# Patient Record
Sex: FEMALE | Race: WHITE | NOT HISPANIC OR LATINO | Employment: UNEMPLOYED | URBAN - NONMETROPOLITAN AREA
[De-identification: names, ages, dates, MRNs, and addresses within clinical notes are randomized per-mention and may not be internally consistent; named-entity substitution may affect disease eponyms.]

---

## 2017-08-24 ENCOUNTER — HOSPITAL ENCOUNTER (EMERGENCY)
Facility: HOSPITAL | Age: 27
Discharge: HOME | End: 2017-08-25
Attending: EMERGENCY MEDICINE | Admitting: EMERGENCY MEDICINE

## 2017-08-24 DIAGNOSIS — IMO0002 LACERATION: Primary | ICD-10-CM

## 2017-08-24 DIAGNOSIS — IMO0002 SELF-INFLICTED INJURY: ICD-10-CM

## 2017-08-24 DIAGNOSIS — R74.8 ELEVATED LIVER ENZYMES: ICD-10-CM

## 2017-08-24 DIAGNOSIS — F32.A DEPRESSION, UNSPECIFIED DEPRESSION TYPE: ICD-10-CM

## 2017-08-24 PROCEDURE — 80307 DRUG TEST PRSMV CHEM ANLYZR: CPT | Performed by: EMERGENCY MEDICINE

## 2017-08-24 PROCEDURE — 80074 ACUTE HEPATITIS PANEL: CPT | Performed by: EMERGENCY MEDICINE

## 2017-08-24 PROCEDURE — 85025 COMPLETE CBC W/AUTO DIFF WBC: CPT | Performed by: EMERGENCY MEDICINE

## 2017-08-24 PROCEDURE — 99284 EMERGENCY DEPT VISIT MOD MDM: CPT

## 2017-08-24 PROCEDURE — 80053 COMPREHEN METABOLIC PANEL: CPT | Performed by: EMERGENCY MEDICINE

## 2017-08-24 RX ORDER — SODIUM CHLORIDE 0.9 % (FLUSH) 0.9 %
10 SYRINGE (ML) INJECTION AS NEEDED
Status: DISCONTINUED | OUTPATIENT
Start: 2017-08-24 | End: 2017-08-25

## 2017-08-25 VITALS
OXYGEN SATURATION: 99 % | SYSTOLIC BLOOD PRESSURE: 104 MMHG | TEMPERATURE: 98.3 F | WEIGHT: 203 LBS | DIASTOLIC BLOOD PRESSURE: 67 MMHG | HEIGHT: 66 IN | HEART RATE: 94 BPM | RESPIRATION RATE: 18 BRPM | BODY MASS INDEX: 32.62 KG/M2

## 2017-08-25 LAB
ALBUMIN SERPL-MCNC: 4.4 G/DL (ref 3.5–5)
ALBUMIN/GLOB SERPL: 1.5 G/DL (ref 1–2)
ALP SERPL-CCNC: 73 U/L (ref 38–126)
ALT SERPL W P-5'-P-CCNC: 392 U/L (ref 13–69)
AMPHET+METHAMPHET UR QL: NEGATIVE
AMPHETAMINES UR QL: NEGATIVE
ANION GAP SERPL CALCULATED.3IONS-SCNC: 18.4 MMOL/L
APAP SERPL-MCNC: <10 MCG/ML
AST SERPL-CCNC: 339 U/L (ref 15–46)
B-HCG UR QL: NEGATIVE
BACTERIA UR QL AUTO: ABNORMAL /HPF
BARBITURATES UR QL SCN: NEGATIVE
BASOPHILS # BLD AUTO: 0.06 10*3/MM3 (ref 0–0.2)
BASOPHILS NFR BLD AUTO: 0.8 % (ref 0–2.5)
BENZODIAZ UR QL SCN: NEGATIVE
BILIRUB SERPL-MCNC: 0.2 MG/DL (ref 0.2–1.3)
BILIRUB UR QL STRIP: NEGATIVE
BUN BLD-MCNC: 10 MG/DL (ref 7–20)
BUN/CREAT SERPL: 14.3 (ref 7.1–23.5)
BUPRENORPHINE SERPL-MCNC: NEGATIVE NG/ML
CALCIUM SPEC-SCNC: 8.9 MG/DL (ref 8.4–10.2)
CANNABINOIDS SERPL QL: NEGATIVE
CHLORIDE SERPL-SCNC: 109 MMOL/L (ref 98–107)
CLARITY UR: CLEAR
CO2 SERPL-SCNC: 22 MMOL/L (ref 26–30)
COCAINE UR QL: NEGATIVE
COLOR UR: YELLOW
CREAT BLD-MCNC: 0.7 MG/DL (ref 0.6–1.3)
DEPRECATED RDW RBC AUTO: 39.8 FL (ref 37–54)
EOSINOPHIL # BLD AUTO: 0.15 10*3/MM3 (ref 0–0.7)
EOSINOPHIL NFR BLD AUTO: 1.9 % (ref 0–7)
ERYTHROCYTE [DISTWIDTH] IN BLOOD BY AUTOMATED COUNT: 12.2 % (ref 11.5–14.5)
ETHANOL BLD-MCNC: 175 MG/DL
ETHANOL UR QL: 0.17 %
GFR SERPL CREATININE-BSD FRML MDRD: 101 ML/MIN/1.73
GLOBULIN UR ELPH-MCNC: 2.9 GM/DL
GLUCOSE BLD-MCNC: 101 MG/DL (ref 74–98)
GLUCOSE UR STRIP-MCNC: NEGATIVE MG/DL
HCT VFR BLD AUTO: 40.9 % (ref 37–47)
HGB BLD-MCNC: 13.3 G/DL (ref 12–16)
HGB UR QL STRIP.AUTO: ABNORMAL
HOLD SPECIMEN: NORMAL
HOLD SPECIMEN: NORMAL
HYALINE CASTS UR QL AUTO: ABNORMAL /LPF
IMM GRANULOCYTES # BLD: 0.02 10*3/MM3 (ref 0–0.06)
IMM GRANULOCYTES NFR BLD: 0.3 % (ref 0–0.6)
KETONES UR QL STRIP: NEGATIVE
LEUKOCYTE ESTERASE UR QL STRIP.AUTO: NEGATIVE
LYMPHOCYTES # BLD AUTO: 2.78 10*3/MM3 (ref 0.6–3.4)
LYMPHOCYTES NFR BLD AUTO: 35 % (ref 10–50)
MCH RBC QN AUTO: 29.2 PG (ref 27–31)
MCHC RBC AUTO-ENTMCNC: 32.5 G/DL (ref 30–37)
MCV RBC AUTO: 89.7 FL (ref 81–99)
METHADONE UR QL SCN: NEGATIVE
MONOCYTES # BLD AUTO: 0.66 10*3/MM3 (ref 0–0.9)
MONOCYTES NFR BLD AUTO: 8.3 % (ref 0–12)
NEUTROPHILS # BLD AUTO: 4.27 10*3/MM3 (ref 2–6.9)
NEUTROPHILS NFR BLD AUTO: 53.7 % (ref 37–80)
NITRITE UR QL STRIP: NEGATIVE
NRBC BLD MANUAL-RTO: 0 /100 WBC (ref 0–0)
OPIATES UR QL: NEGATIVE
OXYCODONE UR QL SCN: NEGATIVE
PCP UR QL SCN: NEGATIVE
PH UR STRIP.AUTO: 5.5 [PH] (ref 5–8)
PLATELET # BLD AUTO: 298 10*3/MM3 (ref 130–400)
PMV BLD AUTO: 9.4 FL (ref 6–12)
POTASSIUM BLD-SCNC: 3.4 MMOL/L (ref 3.5–5.1)
PROPOXYPH UR QL: NEGATIVE
PROT SERPL-MCNC: 7.3 G/DL (ref 6.3–8.2)
PROT UR QL STRIP: NEGATIVE
RBC # BLD AUTO: 4.56 10*6/MM3 (ref 4.2–5.4)
RBC # UR: ABNORMAL /HPF
REF LAB TEST METHOD: ABNORMAL
SALICYLATES SERPL-MCNC: <1 MG/DL (ref 2.8–20)
SODIUM BLD-SCNC: 146 MMOL/L (ref 137–145)
SP GR UR STRIP: 1.01 (ref 1–1.03)
SQUAMOUS #/AREA URNS HPF: ABNORMAL /HPF
TRICYCLICS UR QL SCN: NEGATIVE
UROBILINOGEN UR QL STRIP: ABNORMAL
WBC NRBC COR # BLD: 7.94 10*3/MM3 (ref 4.8–10.8)
WBC UR QL AUTO: ABNORMAL /HPF
WHOLE BLOOD HOLD SPECIMEN: NORMAL
WHOLE BLOOD HOLD SPECIMEN: NORMAL

## 2017-08-25 PROCEDURE — 80306 DRUG TEST PRSMV INSTRMNT: CPT | Performed by: EMERGENCY MEDICINE

## 2017-08-25 PROCEDURE — 90471 IMMUNIZATION ADMIN: CPT | Performed by: EMERGENCY MEDICINE

## 2017-08-25 PROCEDURE — 81025 URINE PREGNANCY TEST: CPT | Performed by: EMERGENCY MEDICINE

## 2017-08-25 PROCEDURE — 25010000002 TDAP 5-2.5-18.5 LF-MCG/0.5 SUSPENSION: Performed by: EMERGENCY MEDICINE

## 2017-08-25 PROCEDURE — 81001 URINALYSIS AUTO W/SCOPE: CPT | Performed by: EMERGENCY MEDICINE

## 2017-08-25 PROCEDURE — 90715 TDAP VACCINE 7 YRS/> IM: CPT | Performed by: EMERGENCY MEDICINE

## 2017-08-25 RX ORDER — BACITRACIN ZINC 500 [USP'U]/G
OINTMENT TOPICAL ONCE
Status: COMPLETED | OUTPATIENT
Start: 2017-08-25 | End: 2017-08-25

## 2017-08-25 RX ORDER — NICOTINE 21 MG/24HR
1 PATCH, TRANSDERMAL 24 HOURS TRANSDERMAL EVERY 24 HOURS
Status: DISCONTINUED | OUTPATIENT
Start: 2017-08-25 | End: 2017-08-25

## 2017-08-25 RX ORDER — LIDOCAINE HYDROCHLORIDE AND EPINEPHRINE 10; 10 MG/ML; UG/ML
10 INJECTION, SOLUTION INFILTRATION; PERINEURAL ONCE
Status: COMPLETED | OUTPATIENT
Start: 2017-08-25 | End: 2017-08-25

## 2017-08-25 RX ADMIN — TETANUS TOXOID, REDUCED DIPHTHERIA TOXOID AND ACELLULAR PERTUSSIS VACCINE, ADSORBED 0.5 ML: 5; 2.5; 8; 8; 2.5 SUSPENSION INTRAMUSCULAR at 00:46

## 2017-08-25 RX ADMIN — LIDOCAINE HYDROCHLORIDE,EPINEPHRINE BITARTRATE 10 ML: 10; .01 INJECTION, SOLUTION INFILTRATION; PERINEURAL at 01:42

## 2017-08-25 RX ADMIN — BACITRACIN ZINC: 500 OINTMENT TOPICAL at 03:07

## 2017-08-25 RX ADMIN — NICOTINE 1 PATCH: 21 PATCH TRANSDERMAL at 00:46

## 2017-08-25 NOTE — ED PROVIDER NOTES
"Subjective   HPI Comments: 27-year-old female presenting with self-inflicted laceration.  She states tonight she had \"too much to drink and was tired of it\".  She apparently cut herself with a razor the left forearm.  Family found her and called EMS.  She denies any suicidality.  Though when I questioned her and told her it certainly looks like she was trying to harm herself she stated \"well you know sometimes he does get really down\".  She does have a long history of depression and suicide attempts.  She has no other complaints or concerns at this time.  She denies any other drug use.      Review of Systems   Constitutional: Negative for chills and fever.   HENT: Negative for congestion, rhinorrhea and sore throat.    Eyes: Negative for pain.   Respiratory: Negative for cough and shortness of breath.    Cardiovascular: Negative for chest pain, palpitations and leg swelling.   Gastrointestinal: Negative for abdominal pain, diarrhea, nausea and vomiting.   Genitourinary: Negative for dysuria.   Musculoskeletal: Negative for arthralgias.   Skin: Negative for rash.   Neurological: Negative for weakness and numbness.   Psychiatric/Behavioral: Positive for dysphoric mood, self-injury and suicidal ideas. Negative for behavioral problems.       Past Medical History:   Diagnosis Date   • Asthma    • Bipolar disorder, now depressed    • Depression        Allergies   Allergen Reactions   • Penicillins    • Shellfish-Derived Products        History reviewed. No pertinent surgical history.    History reviewed. No pertinent family history.    Social History     Social History   • Marital status: Single     Spouse name: N/A   • Number of children: N/A   • Years of education: N/A     Social History Main Topics   • Smoking status: Current Every Day Smoker   • Smokeless tobacco: None   • Alcohol use None   • Drug use: None   • Sexual activity: Not Asked     Other Topics Concern   • None     Social History Narrative   • None "           Objective   Physical Exam   Constitutional: She is oriented to person, place, and time. She appears well-developed and well-nourished. No distress.   Obviously intoxicated   HENT:   Head: Normocephalic and atraumatic.   Right Ear: External ear normal.   Left Ear: External ear normal.   Nose: Nose normal.   Mouth/Throat: Oropharynx is clear and moist.   Eyes: Conjunctivae and EOM are normal. Pupils are equal, round, and reactive to light.   Neck: Normal range of motion. Neck supple.   Cardiovascular: Normal rate, regular rhythm, normal heart sounds and intact distal pulses.    Pulmonary/Chest: Effort normal and breath sounds normal. No respiratory distress.   Abdominal: Soft. Bowel sounds are normal. She exhibits no distension. There is no tenderness. There is no rebound and no guarding.   Musculoskeletal: Normal range of motion. She exhibits no edema, tenderness or deformity.   Neurological: She is alert and oriented to person, place, and time.   Skin: Skin is warm and dry. No rash noted.   Several linear lacerations to the left volar forearm, one of which is quite deep into the subcutaneous tissue   Psychiatric: She has a normal mood and affect. Her behavior is normal.   Nursing note and vitals reviewed.      Laceration Repair  Date/Time: 8/25/2017 2:39 AM  Performed by: LES HIGGINS  Authorized by: LES HIGGINS   Consent: Verbal consent obtained.  Consent given by: patient  Body area: upper extremity  Location details: left lower arm  Laceration length: 14 cm  Foreign bodies: no foreign bodies  Tendon involvement: none  Nerve involvement: none  Vascular damage: no  Anesthesia: local infiltration    Anesthesia:  Local Anesthetic: lidocaine 1% with epinephrine  Anesthetic total: 4 mL  Irrigation solution: saline  Irrigation method: syringe  Amount of cleaning: extensive  Debridement: minimal  Degree of undermining: none  Skin closure: 4-0 Prolene and glue  Number of sutures: 6  Technique:  complex and horizontal mattress  Approximation: close  Approximation difficulty: complex (lacerations were over tattoo)  Dressing: 4x4 sterile gauze and antibiotic ointment  Patient tolerance: Patient tolerated the procedure well with no immediate complications  Comments: Multiple linear lacerations to the left forearm, 2 lacerations were approximately 4 cm each and repair with skin glue, the third laceration was approximately 6 cm and repaired with sutures.  Approximation was very difficult given lacerations were through a tattoo.               ED Course  ED Course                  MDM  Number of Diagnoses or Management Options  Depression, unspecified depression type:   Elevated liver enzymes:   Laceration:   Self-inflicted injury:   Diagnosis management comments: 27-year-old intoxicated female with self-inflicted laceration.  Well-developed, well-nourished female in no distress with normal vital signs and exam as above.  Given her presentation she will need psychiatric evaluation.  We will need to be repaired, will update tetanus.  Disposition pending workup and consultation.    DDX: Depression, suicidal ideations, suicidal attempt, self-injurious behavior, intoxication    Lab work notable for elevated liver enzymes. I have sent a hepatitis panel. Wounds repaired. She was evaluated by behavioral health. Will discharge home with close outpatient follow up. She has a safe environment to go to tonight.       Amount and/or Complexity of Data Reviewed  Clinical lab tests: reviewed        Final diagnoses:   Laceration   Depression, unspecified depression type   Self-inflicted injury   Elevated liver enzymes            Campbell Cook MD  08/25/17 8143

## 2017-08-25 NOTE — CONSULTS
"[id number]    DATA:  Behavioral Health Navigator received request for behavioral health consult from [facility] ED staff, RN MD oJey Braun.  Navigator met with patient at bedside.  Patient presents to ED for a laceration to her left arm.  The patient is a 27 year old white female who presents to the ED intoxicated, with a self inflicted cut to her left arm.  Patient originally presented with ETOH level of 175.  Navigator waited until MD cleared patient for clinical sobriety and until the wound was tended to and cleaned. Patient reports she is living with a family member in [place], has moved back here to her hometown after living with her sister in [place]. She reports a hx of bipolar dx at age 16, but does not see an outpatient provider for mental health management.  She reports her girlfriend is an addict and recently she has been using ice and meth with her girlfriend.  Although the patient's toxicology screen is negative for all illicit substances, the patient report she will occasionally use with her girlfriend.  Patient states that she is socially anxious and using substances often makes her feel more confident and outgoing.  Patient reports that tonight, she and her girlfriend got into a verbal altercation.  The patient states she felt like she \"would never come before dope\" in the eyes of her girlfriend and this adds to her anxiety and depression.  She reports she went home and was cooking dinner and asked her family member whom she lives with to  a bottle of vodka to spilt on the way home.  Bhargavi, the patients family member, confirms this.  The two parties were drinking, and the patient drank several glasses of vodka very quickly, and she reports she blacked out and woke up and had cut herself.  She denied to EMS and to Triage that this was not a suicide attempt, but rather that \"when I get down, I used to cut to relieve stress and anxiety.\"  She reports she has not inflicted self harm in several years.  " "She denies any suicidal ideation and denies a death wish at this time.  She reports she is having a very difficult night, and reports \"dating and addict is miserable.\"  It appears the patient is under relationship stres, and struggles with anxiety, without any mental health management.  She also appears to be in the beginning stages of addiction.  She denies using daily, and although not chemically dependent, she appears to display co dependent behaviors of using substances with her girlfriend.  She is joined with several family members in the ED who are all very supportive and are comfortable with returning home. The patient is denying all high risk indicators at this time.    ASSESSMENT:  Upon assessment, patient is alert and oriented x3.  Patient is denying VH/AH and does not appear to be experiencing any perceptual disturbances.  Patient appears to be experiencing anxiety, depression, and substance abuse issues.   Patient affect is irritable, but cooperative with prompting from therapist.  She appears remorseful for her behavior, and states that this does not happen often.  She does open up by saying \"I've been through a lot of shit, and it is relieving to talk about it.\"  Patient is actively denying SI/HI.  Navigator administered CSSRS for suicide risk assessment.  The results of patient’s CSSRS suggest that patient was engaging in self harm by cutting, but she denies this was any time of suicide attempt or suicidal gesture.  The patient reports she struggles with depression throughout periods of her life, but denies wanting to die.  She states \"I don't wake up and contemplate ways to die, or make plans to do it.  I haven't even cut in a while.\"    Patient reports primary trigger as of late is her girlfriend.  She appears to focus on her girlfriends addiction throughout the assessment as is irritable towards her and how \"she puts dope before me.\"  She presents with appropriate language, thought, and speech " processes.  She displays insight into her depression and anxiety, and is forthcoming about her symptoms.  Patient reports she is exhausted and would like to get sleep, smoke a cigarette and go to bed.  She denies any pursuit in inpatient treatment but does appear to be agreeable for outpatient treatment. Navigator obtained verbal consent from the patient to talk as a group with the patient and Bhargavi.  The group discussed the patient returning home, which Bhargavi confirmed she was supportive of.  Bhargavi confirmed she is off work for the next 2-3 days and would watch over patient and ensured she would make the home a safe environment.  Patient agreeable as well.    PLAN:  At this time, this Navigator recommends outpatient treatment based upon the patient denying all high risk indicators, and denying wanting to pursue any inpatient services.  Navigator did stress the importance of outpatient follow up for both mental health and substance abuse services.  Navigator used psychoeducation to explain the levels of care, and stressed to the patient to return to ED if any symptoms worsen.  Patient agreeable.  Patient provided with outpatient follow up as well as multiple crisis contacts, and encouraged to return to ED any time.  Patient reports to be agreeable for tx. Bhargavi, her support system, is also comfortable and agreeable. Navigator informed [facility] ED staff, RN MD Joey Carlson who are agreeable to plan.    -[employer]CAR Pride.

## 2017-08-26 LAB
HAV IGM SERPL QL IA: NEGATIVE
HBV CORE IGM SERPL QL IA: NEGATIVE
HBV SURFACE AG SERPL QL IA: NEGATIVE
HCV AB S/CO SERPL IA: <0.1 S/CO RATIO (ref 0–0.9)

## 2017-11-06 ENCOUNTER — APPOINTMENT (OUTPATIENT)
Dept: GENERAL RADIOLOGY | Facility: HOSPITAL | Age: 27
End: 2017-11-06

## 2017-11-06 ENCOUNTER — HOSPITAL ENCOUNTER (EMERGENCY)
Facility: HOSPITAL | Age: 27
Discharge: HOME | End: 2017-11-06
Attending: STUDENT IN AN ORGANIZED HEALTH CARE EDUCATION/TRAINING PROGRAM | Admitting: STUDENT IN AN ORGANIZED HEALTH CARE EDUCATION/TRAINING PROGRAM

## 2017-11-06 VITALS
DIASTOLIC BLOOD PRESSURE: 76 MMHG | OXYGEN SATURATION: 98 % | SYSTOLIC BLOOD PRESSURE: 119 MMHG | HEIGHT: 66 IN | HEART RATE: 125 BPM | BODY MASS INDEX: 33.75 KG/M2 | RESPIRATION RATE: 20 BRPM | TEMPERATURE: 98.4 F | WEIGHT: 210 LBS

## 2017-11-06 DIAGNOSIS — S50.11XA CONTUSION OF RIGHT FOREARM, INITIAL ENCOUNTER: Primary | ICD-10-CM

## 2017-11-06 PROCEDURE — 99283 EMERGENCY DEPT VISIT LOW MDM: CPT

## 2017-11-06 PROCEDURE — 73090 X-RAY EXAM OF FOREARM: CPT

## 2017-11-06 RX ORDER — IBUPROFEN 400 MG/1
400 TABLET ORAL ONCE
Status: COMPLETED | OUTPATIENT
Start: 2017-11-06 | End: 2017-11-06

## 2017-11-06 RX ORDER — ACETAMINOPHEN 500 MG
500 TABLET ORAL EVERY 6 HOURS PRN
Qty: 12 TABLET | Refills: 0 | Status: SHIPPED | OUTPATIENT
Start: 2017-11-06

## 2017-11-06 RX ORDER — IBUPROFEN 400 MG/1
400 TABLET ORAL EVERY 8 HOURS PRN
Qty: 12 TABLET | Refills: 0 | Status: SHIPPED | OUTPATIENT
Start: 2017-11-06

## 2017-11-06 RX ORDER — ACETAMINOPHEN 500 MG
500 TABLET ORAL ONCE
Status: COMPLETED | OUTPATIENT
Start: 2017-11-06 | End: 2017-11-06

## 2017-11-06 RX ADMIN — ACETAMINOPHEN 500 MG: 500 TABLET, FILM COATED ORAL at 21:05

## 2017-11-06 RX ADMIN — IBUPROFEN 400 MG: 400 TABLET, FILM COATED ORAL at 21:07
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
